# Patient Record
Sex: MALE | Race: WHITE | NOT HISPANIC OR LATINO | Employment: OTHER | URBAN - METROPOLITAN AREA
[De-identification: names, ages, dates, MRNs, and addresses within clinical notes are randomized per-mention and may not be internally consistent; named-entity substitution may affect disease eponyms.]

---

## 2017-02-21 ENCOUNTER — TELEPHONE (OUTPATIENT)
Dept: INTERNAL MEDICINE | Facility: CLINIC | Age: 82
End: 2017-02-21

## 2017-02-21 NOTE — TELEPHONE ENCOUNTER
Fax is received requesting orders for left and right knee brace and heating pad.    i called [facility] and told them we do not order this type of equipment and have no documentation of medical necessity for patient.  i have also faxed back form at their request with denial across it.    i left [facility] at home to call me to discuss fax.

## 2017-02-21 NOTE — TELEPHONE ENCOUNTER
----- Message from Bud Cummings sent at 2/21/2017  9:40 AM CST -----  Contact: [facility]  [phone]  Like to know have office receive fax for back & knee request    Please advise

## 2017-02-22 NOTE — TELEPHONE ENCOUNTER
i told daughter that equipment is not needed.   She agreed.     Knee into calf slight swelling.  Off and on.  Not constant. Orthopedic dr says it is arthritis and shots didn't help.  i told her if continues to bother him to make an appt for us to eval.

## 2017-02-22 NOTE — TELEPHONE ENCOUNTER
----- Message from Sonja Lehman sent at 2/22/2017  7:11 AM CST -----  Contact: Araceli - daughter at [phone]  Patient is returning a phone call.  Who left a message for the patient: Kiki  Does patient know what this is regarding:  pt  Comments:

## 2017-06-06 ENCOUNTER — TELEPHONE (OUTPATIENT)
Dept: INTERNAL MEDICINE | Facility: CLINIC | Age: 82
End: 2017-06-06

## 2017-06-06 NOTE — TELEPHONE ENCOUNTER
We have not received a fax on this patient.  i left [facility] to resend fax and gave our fax number

## 2017-06-06 NOTE — TELEPHONE ENCOUNTER
----- Message from Mercedez Rome sent at 6/6/2017  3:43 PM CDT -----  Contact: Cherie Pickard Gozent [phone]  Cherie was calling to check the status of a request she faxed over today,Please call

## 2017-06-12 NOTE — TELEPHONE ENCOUNTER
Fax received asking for rx for topical pain cram rx (naproxen with lidocaine and naproxen or diclofenac)    i sent back fax denying rx.  Dr appiah has never prescribed this for patient.

## 2017-08-07 ENCOUNTER — OFFICE VISIT (OUTPATIENT)
Dept: INTERNAL MEDICINE | Facility: CLINIC | Age: 82
End: 2017-08-07
Payer: COMMERCIAL

## 2017-08-07 ENCOUNTER — LAB VISIT (OUTPATIENT)
Dept: LAB | Facility: HOSPITAL | Age: 82
End: 2017-08-07
Attending: INTERNAL MEDICINE
Payer: COMMERCIAL

## 2017-08-07 VITALS
BODY MASS INDEX: 27.71 KG/M2 | TEMPERATURE: 98 F | DIASTOLIC BLOOD PRESSURE: 81 MMHG | SYSTOLIC BLOOD PRESSURE: 145 MMHG | RESPIRATION RATE: 16 BRPM | HEART RATE: 90 BPM | WEIGHT: 176.56 LBS | HEIGHT: 67 IN

## 2017-08-07 DIAGNOSIS — R60.0 LEG EDEMA, LEFT: ICD-10-CM

## 2017-08-07 DIAGNOSIS — E78.9 LIPID DISORDER: Primary | ICD-10-CM

## 2017-08-07 LAB
ANION GAP SERPL CALC-SCNC: 11 MMOL/L
BASOPHILS # BLD AUTO: 0.05 K/UL
BASOPHILS NFR BLD: 0.6 %
BUN SERPL-MCNC: 25 MG/DL
CALCIUM SERPL-MCNC: 10.2 MG/DL
CHLORIDE SERPL-SCNC: 109 MMOL/L
CO2 SERPL-SCNC: 24 MMOL/L
CREAT SERPL-MCNC: 1.2 MG/DL
DIFFERENTIAL METHOD: ABNORMAL
EOSINOPHIL # BLD AUTO: 0.4 K/UL
EOSINOPHIL NFR BLD: 4 %
ERYTHROCYTE [DISTWIDTH] IN BLOOD BY AUTOMATED COUNT: 14.2 %
EST. GFR  (AFRICAN AMERICAN): >60 ML/MIN/1.73 M^2
EST. GFR  (NON AFRICAN AMERICAN): 55.2 ML/MIN/1.73 M^2
GLUCOSE SERPL-MCNC: 110 MG/DL
HCT VFR BLD AUTO: 42.4 %
HGB BLD-MCNC: 14.3 G/DL
LYMPHOCYTES # BLD AUTO: 1.5 K/UL
LYMPHOCYTES NFR BLD: 17 %
MCH RBC QN AUTO: 30.4 PG
MCHC RBC AUTO-ENTMCNC: 33.7 G/DL
MCV RBC AUTO: 90 FL
MONOCYTES # BLD AUTO: 0.8 K/UL
MONOCYTES NFR BLD: 8.7 %
NEUTROPHILS # BLD AUTO: 6.3 K/UL
NEUTROPHILS NFR BLD: 69.5 %
PLATELET # BLD AUTO: 215 K/UL
PMV BLD AUTO: 11.5 FL
POTASSIUM SERPL-SCNC: 4.6 MMOL/L
RBC # BLD AUTO: 4.71 M/UL
SODIUM SERPL-SCNC: 144 MMOL/L
WBC # BLD AUTO: 9.05 K/UL

## 2017-08-07 PROCEDURE — 85379 FIBRIN DEGRADATION QUANT: CPT

## 2017-08-07 PROCEDURE — 1126F AMNT PAIN NOTED NONE PRSNT: CPT | Mod: S$GLB,,, | Performed by: INTERNAL MEDICINE

## 2017-08-07 PROCEDURE — 99214 OFFICE O/P EST MOD 30 MIN: CPT | Mod: S$GLB,,, | Performed by: INTERNAL MEDICINE

## 2017-08-07 PROCEDURE — 80048 BASIC METABOLIC PNL TOTAL CA: CPT

## 2017-08-07 PROCEDURE — 36415 COLL VENOUS BLD VENIPUNCTURE: CPT | Mod: PO

## 2017-08-07 PROCEDURE — 85025 COMPLETE CBC W/AUTO DIFF WBC: CPT

## 2017-08-07 PROCEDURE — 3008F BODY MASS INDEX DOCD: CPT | Mod: S$GLB,,, | Performed by: INTERNAL MEDICINE

## 2017-08-07 PROCEDURE — 3079F DIAST BP 80-89 MM HG: CPT | Mod: S$GLB,,, | Performed by: INTERNAL MEDICINE

## 2017-08-07 PROCEDURE — 3077F SYST BP >= 140 MM HG: CPT | Mod: S$GLB,,, | Performed by: INTERNAL MEDICINE

## 2017-08-07 PROCEDURE — 99999 PR PBB SHADOW E&M-EST. PATIENT-LVL III: CPT | Mod: PBBFAC,,, | Performed by: INTERNAL MEDICINE

## 2017-08-07 PROCEDURE — 1159F MED LIST DOCD IN RCRD: CPT | Mod: S$GLB,,, | Performed by: INTERNAL MEDICINE

## 2017-08-07 RX ORDER — PRAVASTATIN SODIUM 40 MG/1
40 TABLET ORAL NIGHTLY
Qty: 90 TABLET | Refills: 3 | Status: SHIPPED | OUTPATIENT
Start: 2017-08-07 | End: 2018-04-23 | Stop reason: SDUPTHER

## 2017-08-08 ENCOUNTER — TELEPHONE (OUTPATIENT)
Dept: INTERNAL MEDICINE | Facility: CLINIC | Age: 82
End: 2017-08-08

## 2017-08-08 DIAGNOSIS — R60.0 LEG EDEMA, LEFT: Primary | ICD-10-CM

## 2017-08-08 LAB — D DIMER PPP IA.FEU-MCNC: 1.58 MG/L FEU

## 2017-08-09 NOTE — TELEPHONE ENCOUNTER
Daughter  Francheska called me back.  She and her sister came for appt.    She can bring him for ct to [place] Thursday anytime or Friday 10-noon.  I sent kyree wang msg to [facility] book asap.  I was unable to find opening.

## 2017-08-10 ENCOUNTER — HOSPITAL ENCOUNTER (OUTPATIENT)
Dept: RADIOLOGY | Facility: HOSPITAL | Age: 82
Discharge: HOME | End: 2017-08-10
Attending: INTERNAL MEDICINE
Payer: COMMERCIAL

## 2017-08-10 DIAGNOSIS — R60.0 LEG EDEMA, LEFT: ICD-10-CM

## 2017-08-10 PROCEDURE — 71275 CT ANGIOGRAPHY CHEST: CPT | Mod: 26,,, | Performed by: RADIOLOGY

## 2017-08-10 PROCEDURE — 71275 CT ANGIOGRAPHY CHEST: CPT | Mod: TC

## 2017-08-10 PROCEDURE — 25500020 PHARM REV CODE 255: Performed by: INTERNAL MEDICINE

## 2017-08-10 RX ADMIN — IOHEXOL 100 ML: 350 INJECTION, SOLUTION INTRAVENOUS at 09:08

## 2017-08-10 NOTE — TELEPHONE ENCOUNTER
I left msg on home recorder ct was all fine.  Call if not improving with leg swelling on support hose or if any questions..

## 2017-08-10 NOTE — TELEPHONE ENCOUNTER
----- Message from Santi Macias MD sent at 8/10/2017 10:28 AM CDT -----  No problems with pulmonary circulation.  He does have some mild dis of arteries

## 2017-08-10 NOTE — PROGRESS NOTES
Subjective:       Patient ID: Yogi Urbina is a 84 y.o. male.    Chief Complaint: Annual Exam (swollen leg)  HISTORY OF PRESENT ILLNESS:  An 84-year-old white male patient of mine coming in   with two of his children, one who is very loquacious.  She did most of the   talking and was talking while I was examining him as well.  He has had problems   with left leg, swelling for the last two weeks.  He has had real problems with   arthritis in his left knee, saw Dr. Gibbs at [facility] for   this recently.  He had sent him subsequent to that visit for an ultrasound of   his left leg, which was reportedly negative.  At the time of the visit, I did   not have that report and subsequently received it.  It was dated 04/22,   suggesting no deep venous thrombosis, but does not talk about whether he has any   venous insufficiency.  The patient is having no shortness of breath or pain.    No fever.    PHYSICAL EXAMINATION:  VITAL SIGNS:  Normal.  SKIN:  Fair and healthy.  The left knee is very thickened.  HEART:  There is a little bit warm.  ABDOMEN:  He has no effusion.  He has a Baker cyst.  EXTREMITIES:  He has 2 to 3+ edema of his left leg below that.  Negative cords.    Negative Homans.  No tenderness.  No heat.    Likely this is venous disease as a result of his arthritic knee interfering with   his venous drainage going past that.  I did lab, which included BMP, which was   normal.  D-dimer is slightly elevated at 1.58 and CBC which was normal.  I told   them to get a support hose for him from the drugstore to put on every morning   and if he could do physical activity involving his calf without putting a strain   on his knee that would be useful, aspirin once a day.  With the elevated   D-dimer, I ordered a CTA of the lung, which showed no pulmonary emboli, but he   does have kidney stones.    IMPRESSION:  1.  Venous sluggishness secondary to his severe arthritis, left knee.  2.  Elevated D-dimer,  probably on the basis of that.  3.  Severe arthritis, left knee.  4.  Kidney stones seen on the CTA, but asymptomatic.      JDC/HN  dd: 08/13/2017 13:13:40 (CDT)  td: 08/14/2017 05:45:33 (CDT)  Doc ID   #[id number]  Job ID #[id number]    CC:     [facility] [id number]  HPI  Review of Systems   Constitutional: Negative for activity change, appetite change, fatigue and unexpected weight change.   HENT: Negative for dental problem, hearing loss, mouth sores, postnasal drip and sinus pressure.    Eyes: Negative for discharge and visual disturbance.   Respiratory: Negative for cough and shortness of breath.    Cardiovascular: Positive for leg swelling. Negative for chest pain and palpitations.   Gastrointestinal: Negative for abdominal pain, blood in stool, constipation, diarrhea and nausea.   Genitourinary: Negative for dysuria, hematuria and testicular pain.   Musculoskeletal: Positive for arthralgias and joint swelling. Negative for back pain and neck pain.   Skin: Negative for rash.   Neurological: Negative for weakness and headaches.   Psychiatric/Behavioral: Negative for agitation and sleep disturbance.       Objective:      Physical Exam   Constitutional: He is oriented to person, place, and time. He appears well-developed and well-nourished.   HENT:   Head: Normocephalic.   Right Ear: External ear normal.   Left Ear: External ear normal.   Mouth/Throat: Oropharynx is clear and moist.   Eyes: EOM are normal. Pupils are equal, round, and reactive to light. Right eye exhibits no discharge.   Neck: Normal range of motion. No JVD present. No tracheal deviation present. No thyromegaly present.   Cardiovascular: Normal rate, regular rhythm, normal heart sounds and intact distal pulses.  Exam reveals no gallop.    No murmur heard.  Pulmonary/Chest: Effort normal and breath sounds normal. He has no wheezes. He has no rales.   Abdominal: Soft. Bowel sounds are normal. He exhibits no mass. There is no tenderness.   Genitourinary: Prostate  normal and penis normal. Rectal exam shows guaiac negative stool.   Musculoskeletal: Normal range of motion. He exhibits edema and tenderness.   Lymphadenopathy:     He has no cervical adenopathy.   Neurological: He is oriented to person, place, and time. He displays normal reflexes. No cranial nerve deficit. Coordination normal.   Skin: Skin is warm. No rash noted. No pallor.   Psychiatric: He has a normal mood and affect.       Assessment:       1. Lipid disorder    2. Leg edema, left        Plan:

## 2018-04-23 ENCOUNTER — OFFICE VISIT (OUTPATIENT)
Dept: INTERNAL MEDICINE | Facility: CLINIC | Age: 83
End: 2018-04-23
Payer: COMMERCIAL

## 2018-04-23 VITALS
WEIGHT: 183 LBS | SYSTOLIC BLOOD PRESSURE: 128 MMHG | DIASTOLIC BLOOD PRESSURE: 78 MMHG | HEART RATE: 76 BPM | HEIGHT: 67 IN | RESPIRATION RATE: 16 BRPM | BODY MASS INDEX: 28.72 KG/M2 | TEMPERATURE: 98 F

## 2018-04-23 DIAGNOSIS — E78.9 LIPID DISORDER: ICD-10-CM

## 2018-04-23 DIAGNOSIS — M75.112 INCOMPLETE TEAR OF LEFT ROTATOR CUFF: ICD-10-CM

## 2018-04-23 DIAGNOSIS — R60.0 LOWER LEG EDEMA: Primary | ICD-10-CM

## 2018-04-23 DIAGNOSIS — M17.0 ARTHRITIS OF BOTH KNEES: ICD-10-CM

## 2018-04-23 PROCEDURE — 99214 OFFICE O/P EST MOD 30 MIN: CPT | Mod: S$GLB,,, | Performed by: INTERNAL MEDICINE

## 2018-04-23 PROCEDURE — 3078F DIAST BP <80 MM HG: CPT | Mod: CPTII,S$GLB,, | Performed by: INTERNAL MEDICINE

## 2018-04-23 PROCEDURE — 99999 PR PBB SHADOW E&M-EST. PATIENT-LVL III: CPT | Mod: PBBFAC,,, | Performed by: INTERNAL MEDICINE

## 2018-04-23 PROCEDURE — 3074F SYST BP LT 130 MM HG: CPT | Mod: CPTII,S$GLB,, | Performed by: INTERNAL MEDICINE

## 2018-04-23 RX ORDER — PRAVASTATIN SODIUM 40 MG/1
40 TABLET ORAL NIGHTLY
Qty: 90 TABLET | Refills: 3 | Status: SHIPPED | OUTPATIENT
Start: 2018-04-23 | End: 2018-11-13 | Stop reason: SDUPTHER
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
## 2018-11-03 ENCOUNTER — LAB VISIT (OUTPATIENT)
Dept: LAB | Facility: HOSPITAL | Age: 83
End: 2018-11-03
Attending: INTERNAL MEDICINE
Payer: COMMERCIAL

## 2018-11-03 DIAGNOSIS — Z00.00 ANNUAL PHYSICAL EXAM: ICD-10-CM

## 2018-11-03 DIAGNOSIS — Z00.00 ANNUAL PHYSICAL EXAM: Primary | ICD-10-CM

## 2018-11-03 LAB
ANION GAP SERPL CALC-SCNC: 8 MMOL/L
BASOPHILS # BLD AUTO: 0.08 K/UL
BASOPHILS NFR BLD: 1 %
BUN SERPL-MCNC: 17 MG/DL
CALCIUM SERPL-MCNC: 10.1 MG/DL
CHLORIDE SERPL-SCNC: 107 MMOL/L
CO2 SERPL-SCNC: 27 MMOL/L
CREAT SERPL-MCNC: 1.1 MG/DL
DIFFERENTIAL METHOD: ABNORMAL
EOSINOPHIL # BLD AUTO: 0.6 K/UL
EOSINOPHIL NFR BLD: 7.2 %
ERYTHROCYTE [DISTWIDTH] IN BLOOD BY AUTOMATED COUNT: 13.9 %
EST. GFR  (AFRICAN AMERICAN): >60 ML/MIN/1.73 M^2
EST. GFR  (NON AFRICAN AMERICAN): >60 ML/MIN/1.73 M^2
GLUCOSE SERPL-MCNC: 98 MG/DL
HCT VFR BLD AUTO: 46.5 %
HGB BLD-MCNC: 14.7 G/DL
IMM GRANULOCYTES # BLD AUTO: 0.02 K/UL
IMM GRANULOCYTES NFR BLD AUTO: 0.2 %
LYMPHOCYTES # BLD AUTO: 1.5 K/UL
LYMPHOCYTES NFR BLD: 18 %
MCH RBC QN AUTO: 29.1 PG
MCHC RBC AUTO-ENTMCNC: 31.6 G/DL
MCV RBC AUTO: 92 FL
MONOCYTES # BLD AUTO: 0.8 K/UL
MONOCYTES NFR BLD: 10.2 %
NEUTROPHILS # BLD AUTO: 5.2 K/UL
NEUTROPHILS NFR BLD: 63.4 %
NRBC BLD-RTO: 0 /100 WBC
PLATELET # BLD AUTO: 222 K/UL
PMV BLD AUTO: 11.6 FL
POTASSIUM SERPL-SCNC: 4.9 MMOL/L
RBC # BLD AUTO: 5.06 M/UL
SODIUM SERPL-SCNC: 142 MMOL/L
WBC # BLD AUTO: 8.16 K/UL

## 2018-11-03 PROCEDURE — 80048 BASIC METABOLIC PNL TOTAL CA: CPT

## 2018-11-03 PROCEDURE — 85025 COMPLETE CBC W/AUTO DIFF WBC: CPT

## 2018-11-03 PROCEDURE — 85379 FIBRIN DEGRADATION QUANT: CPT

## 2018-11-03 PROCEDURE — 36415 COLL VENOUS BLD VENIPUNCTURE: CPT | Mod: PO

## 2018-11-05 ENCOUNTER — TELEPHONE (OUTPATIENT)
Dept: INTERNAL MEDICINE | Facility: CLINIC | Age: 83
End: 2018-11-05

## 2018-11-05 DIAGNOSIS — Z00.00 ANNUAL PHYSICAL EXAM: Primary | ICD-10-CM

## 2018-11-05 LAB — D DIMER PPP IA.FEU-MCNC: 1.13 MG/L FEU

## 2018-11-05 NOTE — TELEPHONE ENCOUNTER
Appreciate dr tello help with orders but they were not annual labs.  I left msg for daughter, apologized but more labs need to be done.  I never got a original msg for the orders.    I put in the additional labs he needs.  I said she can either come in earlier to see dr appiah and stop at [facility] to do fasting labs or we can arrange another day  If she wants  To.    Lab orders are in dated 11/5/18 . I booked same day seeing dr appiah and daughter can move if wants to.

## 2018-11-05 NOTE — TELEPHONE ENCOUNTER
----- Message from Pavithra Herman sent at 11/3/2018  9:52 AM CDT -----  Contact: Patient and daughter have labs scheduled today!! 11/3/18  Kiki,    Patient and daughter came in today (11/3/18), for labs that was scheduled, but the orders was not in and the daughter was upset because her dad has dementia, and the dad was very anxious, daughter insist on getting the orders in, so they sent them to the [facility], Kiera (rn/ma) was tied up in the room with a patient for a while, so  look at they chart and put the same orders in that Dr. Macias order before for them.  Dr. Fu specifically ask me to let you and Dr. Macias know that she did put them in, please give her a call on Monday, its would be under her name, so I guess you guys can re-enter under Dr. Macias name?      Thanks Steffi,

## 2018-11-05 NOTE — TELEPHONE ENCOUNTER
----- Message from Pavithra Herman sent at 11/3/2018 10:47 AM CDT -----  Contact: Patient was here for labs on Saturday 11/3/18  Kiki,  I'm sorry, I don't think I gave your the patients name and cl#.  Yogi Urbina Cl# [id number], and daughter; Liliana Cl# [id number].    Thanks Steffi,

## 2018-11-13 ENCOUNTER — OFFICE VISIT (OUTPATIENT)
Dept: INTERNAL MEDICINE | Facility: CLINIC | Age: 83
End: 2018-11-13
Payer: COMMERCIAL

## 2018-11-13 ENCOUNTER — LAB VISIT (OUTPATIENT)
Dept: LAB | Facility: HOSPITAL | Age: 83
End: 2018-11-13
Attending: INTERNAL MEDICINE
Payer: COMMERCIAL

## 2018-11-13 VITALS
HEART RATE: 84 BPM | WEIGHT: 175.69 LBS | DIASTOLIC BLOOD PRESSURE: 80 MMHG | RESPIRATION RATE: 18 BRPM | TEMPERATURE: 98 F | BODY MASS INDEX: 27.57 KG/M2 | HEIGHT: 67 IN | SYSTOLIC BLOOD PRESSURE: 144 MMHG

## 2018-11-13 DIAGNOSIS — M54.16 LUMBAR RADICULOPATHY: Primary | ICD-10-CM

## 2018-11-13 DIAGNOSIS — E78.9 LIPID DISORDER: ICD-10-CM

## 2018-11-13 DIAGNOSIS — Z00.00 ANNUAL PHYSICAL EXAM: ICD-10-CM

## 2018-11-13 DIAGNOSIS — M17.0 ARTHRITIS OF BOTH KNEES: ICD-10-CM

## 2018-11-13 DIAGNOSIS — R60.0 LOWER LEG EDEMA: ICD-10-CM

## 2018-11-13 LAB
ALBUMIN SERPL BCP-MCNC: 3.7 G/DL
ALP SERPL-CCNC: 57 U/L
ALT SERPL W/O P-5'-P-CCNC: 10 U/L
ANION GAP SERPL CALC-SCNC: 9 MMOL/L
AST SERPL-CCNC: 11 U/L
BILIRUB SERPL-MCNC: 1 MG/DL
BUN SERPL-MCNC: 18 MG/DL
CALCIUM SERPL-MCNC: 9.2 MG/DL
CHLORIDE SERPL-SCNC: 104 MMOL/L
CHOLEST SERPL-MCNC: 189 MG/DL
CHOLEST/HDLC SERPL: 4.8 {RATIO}
CO2 SERPL-SCNC: 28 MMOL/L
COMPLEXED PSA SERPL-MCNC: 3.2 NG/ML
CREAT SERPL-MCNC: 1.2 MG/DL
EST. GFR  (AFRICAN AMERICAN): >60 ML/MIN/1.73 M^2
EST. GFR  (NON AFRICAN AMERICAN): 54.4 ML/MIN/1.73 M^2
GLUCOSE SERPL-MCNC: 92 MG/DL
HDLC SERPL-MCNC: 39 MG/DL
HDLC SERPL: 20.6 %
LDLC SERPL CALC-MCNC: 111.6 MG/DL
NONHDLC SERPL-MCNC: 150 MG/DL
POTASSIUM SERPL-SCNC: 4.1 MMOL/L
PROT SERPL-MCNC: 6.7 G/DL
SODIUM SERPL-SCNC: 141 MMOL/L
TRIGL SERPL-MCNC: 192 MG/DL
TSH SERPL DL<=0.005 MIU/L-ACNC: 3.64 UIU/ML

## 2018-11-13 PROCEDURE — 36415 COLL VENOUS BLD VENIPUNCTURE: CPT | Mod: PO

## 2018-11-13 PROCEDURE — 80061 LIPID PANEL: CPT

## 2018-11-13 PROCEDURE — 84153 ASSAY OF PSA TOTAL: CPT

## 2018-11-13 PROCEDURE — 80053 COMPREHEN METABOLIC PANEL: CPT

## 2018-11-13 PROCEDURE — 99999 PR PBB SHADOW E&M-EST. PATIENT-LVL III: CPT | Mod: PBBFAC,,, | Performed by: INTERNAL MEDICINE

## 2018-11-13 PROCEDURE — 99397 PER PM REEVAL EST PAT 65+ YR: CPT | Mod: S$GLB,,, | Performed by: INTERNAL MEDICINE

## 2018-11-13 PROCEDURE — 84443 ASSAY THYROID STIM HORMONE: CPT

## 2018-11-13 RX ORDER — PRAVASTATIN SODIUM 40 MG/1
40 TABLET ORAL NIGHTLY
Qty: 90 TABLET | Refills: 3 | Status: SHIPPED | OUTPATIENT
Start: 2018-11-13 | End: 2020-01-06

## 2018-11-13 NOTE — PROGRESS NOTES
Subjective:       Patient ID: Yogi Urbina is a 86 y.o. male.    Chief Complaint: Annual Exam and Medication Refill  HISTORY OF PRESENT ILLNESS:  An 86-year-old white male, comes in for annual   review and is generally feeling well.  The patient's daughter was with him   against his desire because he wanted to make sure he is giving the right   history.  The main thing he is concerned about is his memory, which tends to be   short-term and not very well focused.  The patient has also had some problems   with pain in his knees and back and he has had a history of edema on and off in   his legs.  He has really no other complaints nor does she.  He had some blood   work done about 10 days ago that included a CBC, chemistries that were normal.    Additional testing was done on the day of the visit, which included PSA and   lipids, which showed an elevated triglyceride of 192, a TSH which is normal and   a comprehensive chemistry, which is all normal.    PHYSICAL EXAMINATION:  VITAL SIGNS:  Normal.  SKIN:  Fair and healthy.  HEENT:  Clear.  NECK:  Shows no adenopathy, thyroid enlargement or bruit.  CHEST:  Clear.  HEART:  There is no murmur or gallop.  ABDOMEN:  Nontender without any organomegaly.  NEUROLOGIC:  He has some problems with his memory that are obvious.  Speech is   normal.  He is conversant, interactive in an appropriate fashion.  He has no   focal neurologic problems.    IMPRESSION:  1.  Dementia, probably vascular age related.  2.  History of kidney stones.  3.  Hyperlipidemia.  4.  L-spine disease, controlled.  5.  Bilateral knee pain due to degenerative joint disease.  6.  History of kidney stones and sees his urologist, Dr. Verdugo, at [facility].  I will see him again if all is well in one year and prescriptions   were reordered.      GERARD/VERONICA  dd: 11/25/2018 20:51:46 (CST)  td: 11/26/2018 15:29:34 (CST)  Doc ID   #[id number]  Job ID #[id number]    CC:     Dict [id number]  HPI  Review of Systems    Constitutional: Negative for activity change, appetite change, fatigue and unexpected weight change.   HENT: Negative for dental problem, hearing loss, mouth sores, postnasal drip and sinus pressure.    Eyes: Negative for discharge and visual disturbance.   Respiratory: Negative for cough and shortness of breath.    Cardiovascular: Positive for leg swelling. Negative for chest pain and palpitations.   Gastrointestinal: Negative for abdominal pain, blood in stool, constipation, diarrhea and nausea.   Genitourinary: Negative for dysuria, hematuria and testicular pain.   Musculoskeletal: Positive for arthralgias and joint swelling. Negative for back pain and neck pain.   Skin: Negative for rash.   Neurological: Negative for weakness and headaches.   Psychiatric/Behavioral: Positive for confusion and decreased concentration. Negative for agitation and sleep disturbance.       Objective:      Physical Exam   Constitutional: He is oriented to person, place, and time. He appears well-developed and well-nourished.   HENT:   Head: Normocephalic.   Right Ear: External ear normal.   Left Ear: External ear normal.   Mouth/Throat: Oropharynx is clear and moist.   Eyes: EOM are normal. Pupils are equal, round, and reactive to light. Right eye exhibits no discharge.   Neck: Normal range of motion. No JVD present. No tracheal deviation present. No thyromegaly present.   Cardiovascular: Normal rate, regular rhythm, normal heart sounds and intact distal pulses. Exam reveals no gallop.   No murmur heard.  Pulmonary/Chest: Effort normal and breath sounds normal. He has no wheezes. He has no rales.   Abdominal: Soft. Bowel sounds are normal. He exhibits no mass. There is no tenderness.   Genitourinary: Prostate normal and penis normal. Rectal exam shows guaiac negative stool.   Musculoskeletal: Normal range of motion. He exhibits no edema.   Lymphadenopathy:     He has no cervical adenopathy.   Neurological: He is oriented to person,  place, and time. He displays normal reflexes. No cranial nerve deficit. Coordination normal.   Skin: Skin is warm. No rash noted. No pallor.   Psychiatric: He has a normal mood and affect.       Assessment:       1. Lumbar radiculopathy    2. Lipid disorder    3. Lower leg edema    4. Arthritis of both knees        Plan:

## 2018-11-29 ENCOUNTER — TELEPHONE (OUTPATIENT)
Dept: INTERNAL MEDICINE | Facility: CLINIC | Age: 83
End: 2018-11-29

## 2018-11-29 DIAGNOSIS — M79.604 PAIN IN BOTH LOWER EXTREMITIES: Primary | ICD-10-CM

## 2018-11-29 DIAGNOSIS — M79.605 PAIN IN BOTH LOWER EXTREMITIES: Primary | ICD-10-CM

## 2018-11-29 NOTE — TELEPHONE ENCOUNTER
----- Message from Apurva Bajwa sent at 11/28/2018  2:47 PM CST -----  Contact: [phone]  Calling to request a copy of last lab results be mailed to the home address.

## 2018-11-29 NOTE — TELEPHONE ENCOUNTER
Mailing copy of labs today.  I left msg on her cell explaining 's comments and I can help book the ultrasound if she calls me today.  I said testing is not urgent, just need to be done as a precaution

## 2018-11-29 NOTE — TELEPHONE ENCOUNTER
You saw him for annual and dr blake had ordered labs for you like he had previously so annual labs weren't done till 11/13 appt.    Please advise,  They want a copy mailed.  All ok?  You saw d dimer elevation?    Thanks louis

## 2018-12-13 ENCOUNTER — TELEPHONE (OUTPATIENT)
Dept: INTERNAL MEDICINE | Facility: CLINIC | Age: 83
End: 2018-12-13

## 2018-12-13 NOTE — TELEPHONE ENCOUNTER
He saw orthopedic recently and had xray and all was fine and he is feeling fine so they would like to put ultrasound on hold.    I told them to call to schedule if change their mind.  She wanted copy of labs mailed to her home- I sent to mr. leon address originally and he doesn't remember getting.  Her address [address] [place], [place] [address]

## 2019-11-14 ENCOUNTER — TELEPHONE (OUTPATIENT)
Dept: INTERNAL MEDICINE | Facility: CLINIC | Age: 84
End: 2019-11-14

## 2019-11-14 DIAGNOSIS — Z00.00 ANNUAL PHYSICAL EXAM: Primary | ICD-10-CM

## 2019-11-14 NOTE — TELEPHONE ENCOUNTER
----- Message from Elisha Canalesjohn sent at 11/14/2019  8:45 AM CST -----  Doctor appointment and lab have been scheduled.  Please link lab orders to the lab appointment.  Date of doctor appointment:    Date of lab appointment:    Physical or EP: 1/6/20  Comments: 1/3    REMINDER to appt coordinator: ## delete this from the message after reading! ##     1) Physical Established Patient: NO LABS FIRST for Celestine, Bill, Gus, Soto, Saroj and Murphy  2) The lab appointment must be at least 3 days from now to allow time for the order to be entered and linked to the appointment.   3) Lab appointment should be scheduled at least 3 days before the doctor appointment.

## 2020-01-03 ENCOUNTER — LAB VISIT (OUTPATIENT)
Dept: LAB | Facility: HOSPITAL | Age: 85
End: 2020-01-03
Attending: INTERNAL MEDICINE
Payer: COMMERCIAL

## 2020-01-03 DIAGNOSIS — Z00.00 ANNUAL PHYSICAL EXAM: ICD-10-CM

## 2020-01-03 LAB
ALBUMIN SERPL BCP-MCNC: 3.8 G/DL (ref 3.5–5.2)
ALP SERPL-CCNC: 61 U/L (ref 55–135)
ALT SERPL W/O P-5'-P-CCNC: 13 U/L (ref 10–44)
ANION GAP SERPL CALC-SCNC: 7 MMOL/L (ref 8–16)
AST SERPL-CCNC: 14 U/L (ref 10–40)
BASOPHILS # BLD AUTO: 0.05 K/UL (ref 0–0.2)
BASOPHILS NFR BLD: 0.6 % (ref 0–1.9)
BILIRUB SERPL-MCNC: 0.8 MG/DL (ref 0.1–1)
BUN SERPL-MCNC: 16 MG/DL (ref 8–23)
CALCIUM SERPL-MCNC: 9.7 MG/DL (ref 8.7–10.5)
CHLORIDE SERPL-SCNC: 106 MMOL/L (ref 95–110)
CHOLEST SERPL-MCNC: 185 MG/DL (ref 120–199)
CHOLEST/HDLC SERPL: 3.8 {RATIO} (ref 2–5)
CO2 SERPL-SCNC: 28 MMOL/L (ref 23–29)
COMPLEXED PSA SERPL-MCNC: 5.9 NG/ML (ref 0–4)
CREAT SERPL-MCNC: 1.5 MG/DL (ref 0.5–1.4)
DIFFERENTIAL METHOD: ABNORMAL
EOSINOPHIL # BLD AUTO: 0.4 K/UL (ref 0–0.5)
EOSINOPHIL NFR BLD: 5 % (ref 0–8)
ERYTHROCYTE [DISTWIDTH] IN BLOOD BY AUTOMATED COUNT: 13.8 % (ref 11.5–14.5)
EST. GFR  (AFRICAN AMERICAN): 47.7 ML/MIN/1.73 M^2
EST. GFR  (NON AFRICAN AMERICAN): 41.3 ML/MIN/1.73 M^2
GLUCOSE SERPL-MCNC: 96 MG/DL (ref 70–110)
HCT VFR BLD AUTO: 45.5 % (ref 40–54)
HDLC SERPL-MCNC: 49 MG/DL (ref 40–75)
HDLC SERPL: 26.5 % (ref 20–50)
HGB BLD-MCNC: 14.1 G/DL (ref 14–18)
IMM GRANULOCYTES # BLD AUTO: 0.03 K/UL (ref 0–0.04)
IMM GRANULOCYTES NFR BLD AUTO: 0.4 % (ref 0–0.5)
LDLC SERPL CALC-MCNC: 112.6 MG/DL (ref 63–159)
LYMPHOCYTES # BLD AUTO: 1.6 K/UL (ref 1–4.8)
LYMPHOCYTES NFR BLD: 19.9 % (ref 18–48)
MCH RBC QN AUTO: 29.7 PG (ref 27–31)
MCHC RBC AUTO-ENTMCNC: 31 G/DL (ref 32–36)
MCV RBC AUTO: 96 FL (ref 82–98)
MONOCYTES # BLD AUTO: 0.8 K/UL (ref 0.3–1)
MONOCYTES NFR BLD: 9.5 % (ref 4–15)
NEUTROPHILS # BLD AUTO: 5.3 K/UL (ref 1.8–7.7)
NEUTROPHILS NFR BLD: 64.6 % (ref 38–73)
NONHDLC SERPL-MCNC: 136 MG/DL
NRBC BLD-RTO: 0 /100 WBC
PLATELET # BLD AUTO: 177 K/UL (ref 150–350)
PMV BLD AUTO: 11.4 FL (ref 9.2–12.9)
POTASSIUM SERPL-SCNC: 5.1 MMOL/L (ref 3.5–5.1)
PROT SERPL-MCNC: 6.6 G/DL (ref 6–8.4)
RBC # BLD AUTO: 4.75 M/UL (ref 4.6–6.2)
SODIUM SERPL-SCNC: 141 MMOL/L (ref 136–145)
TRIGL SERPL-MCNC: 117 MG/DL (ref 30–150)
TSH SERPL DL<=0.005 MIU/L-ACNC: 3.24 UIU/ML (ref 0.4–4)
WBC # BLD AUTO: 8.19 K/UL (ref 3.9–12.7)

## 2020-01-03 PROCEDURE — 80061 LIPID PANEL: CPT

## 2020-01-03 PROCEDURE — 85025 COMPLETE CBC W/AUTO DIFF WBC: CPT

## 2020-01-03 PROCEDURE — 84153 ASSAY OF PSA TOTAL: CPT

## 2020-01-03 PROCEDURE — 36415 COLL VENOUS BLD VENIPUNCTURE: CPT | Mod: PO

## 2020-01-03 PROCEDURE — 84443 ASSAY THYROID STIM HORMONE: CPT

## 2020-01-03 PROCEDURE — 80053 COMPREHEN METABOLIC PANEL: CPT

## 2020-01-06 ENCOUNTER — OFFICE VISIT (OUTPATIENT)
Dept: INTERNAL MEDICINE | Facility: CLINIC | Age: 85
End: 2020-01-06
Payer: COMMERCIAL

## 2020-01-06 VITALS
SYSTOLIC BLOOD PRESSURE: 158 MMHG | RESPIRATION RATE: 14 BRPM | DIASTOLIC BLOOD PRESSURE: 84 MMHG | TEMPERATURE: 98 F | BODY MASS INDEX: 28 KG/M2 | WEIGHT: 178.38 LBS | HEIGHT: 67 IN | HEART RATE: 76 BPM

## 2020-01-06 DIAGNOSIS — E78.9 LIPID DISORDER: ICD-10-CM

## 2020-01-06 DIAGNOSIS — Z00.00 ANNUAL PHYSICAL EXAM: Primary | ICD-10-CM

## 2020-01-06 DIAGNOSIS — R97.20 PSA ELEVATION: ICD-10-CM

## 2020-01-06 DIAGNOSIS — M17.0 ARTHRITIS OF BOTH KNEES: ICD-10-CM

## 2020-01-06 DIAGNOSIS — M54.16 LUMBAR RADICULOPATHY: ICD-10-CM

## 2020-01-06 PROCEDURE — 99999 PR PBB SHADOW E&M-EST. PATIENT-LVL III: ICD-10-PCS | Mod: PBBFAC,,, | Performed by: INTERNAL MEDICINE

## 2020-01-06 PROCEDURE — 99999 PR PBB SHADOW E&M-EST. PATIENT-LVL III: CPT | Mod: PBBFAC,,, | Performed by: INTERNAL MEDICINE

## 2020-01-06 PROCEDURE — 99397 PER PM REEVAL EST PAT 65+ YR: CPT | Mod: S$GLB,,, | Performed by: INTERNAL MEDICINE

## 2020-01-06 PROCEDURE — 99397 PR PREVENTIVE VISIT,EST,65 & OVER: ICD-10-PCS | Mod: S$GLB,,, | Performed by: INTERNAL MEDICINE

## 2020-01-06 NOTE — PROGRESS NOTES
Subjective:       Patient ID: Yogi Urbina is a 87 y.o. male.    Chief Complaint: Annual Exam  Dictation #1  MRN:[id number]  CSN:[id number]  Dict 87-year-old white male brought in by his daughter who stayed with him through the exam.  Patient had blood work done prior to the visit which was reviewed with both of them.  He had a PSA of 5.9 which is new and red cells in his urine.  He has had hematuria on all previous visits, with a history of kidney stones.  He has seen Urology in the past.  He he has no come planes except for pain in his left knee for which he sees Orthopedics every few months.  He has slowly progressive dementia.  He occasionally bumps his head on a cabinet which she had done prior to this visit with the scab on the left side of his scalp.    Physical exam:  Vital signs-normal with the exception of slightly elevated blood pressure.  Repeat blood pressure was 139 over 78.    Skin-scab on his some scalp on the left side her chest, and pigmentation and other changes having to do with H  HEENT-bilateral cerumen impaction without symptoms  Neck-no adenopathy, thyroid enlargement, bruit  Chest-clear percussion auscultation  Heart-no murmur, gallop, irregularity  Abdomen-slightly obese, nontender, no organomegaly, no mass, no fullness, no bruit, bowel sounds active  Rectal did-deferred  Extremities-thickening of both knees with no inflammatory change, no edema, pulses intact  Neurologic-moderate dementia with good speech and responsiveness      Impression:  1.  Slowly progressive vascular dementia  2.  Elevated PSA-will repeat in 6 months  3.  Degenerative changes both knees the left is seems to be most symptomatic  4.  History of hyperlipidemia-he was on Pravachol which she discontinued several months ago in his cholesterol is now normal so I have told him to not given the medication anymore  5.  History of kidney stones  6.  Hematuria likely related to his kidney stones    Plan:  1.  I will see him again  in 6 months for repeat PSA and check 2.  We discussed him seeing Urology and they were reluctant to do that 3.  Patient will remain off protocol   HPI  Review of Systems   Constitutional: Negative for activity change, appetite change, fatigue and unexpected weight change.   HENT: Negative for dental problem, hearing loss, mouth sores, postnasal drip and sinus pressure.    Eyes: Negative for discharge and visual disturbance.   Respiratory: Negative for cough and shortness of breath.    Cardiovascular: Negative for chest pain and palpitations.   Gastrointestinal: Negative for abdominal pain, blood in stool, constipation, diarrhea and nausea.   Genitourinary: Negative for dysuria, hematuria and testicular pain.   Musculoskeletal: Positive for arthralgias. Negative for back pain, joint swelling and neck pain.   Skin: Negative for rash.   Neurological: Negative for weakness and headaches.   Psychiatric/Behavioral: Positive for decreased concentration. Negative for agitation and sleep disturbance.       Objective:      Physical Exam   Constitutional: He is oriented to person, place, and time. He appears well-developed and well-nourished.   HENT:   Head: Normocephalic.   Right Ear: External ear normal.   Left Ear: External ear normal.   Mouth/Throat: Oropharynx is clear and moist.   Eyes: Pupils are equal, round, and reactive to light. EOM are normal. Right eye exhibits no discharge.   Neck: Normal range of motion. No JVD present. No tracheal deviation present. No thyromegaly present.   Cardiovascular: Normal rate, regular rhythm, normal heart sounds and intact distal pulses. Exam reveals no gallop.   No murmur heard.  Pulmonary/Chest: Effort normal and breath sounds normal. He has no wheezes. He has no rales.   Abdominal: Soft. Bowel sounds are normal. He exhibits no mass. There is no tenderness.   Genitourinary: Prostate normal and penis normal. Rectal exam shows guaiac negative stool.   Musculoskeletal: Normal range of  motion. He exhibits no edema.   Lymphadenopathy:     He has no cervical adenopathy.   Neurological: He is oriented to person, place, and time. He displays normal reflexes. No cranial nerve deficit. Coordination normal.   Skin: Skin is warm. No rash noted. No pallor.   Psychiatric: He has a normal mood and affect.       Assessment:       1. Annual physical exam    2. Lipid disorder    3. Arthritis of both knees    4. Lumbar radiculopathy        Plan:

## 2020-08-31 ENCOUNTER — TELEPHONE (OUTPATIENT)
Dept: INTERNAL MEDICINE | Facility: CLINIC | Age: 85
End: 2020-08-31

## 2020-08-31 NOTE — TELEPHONE ENCOUNTER
Please set up virtual visit this week and will need records from [facility] before that with discharge not, lab and xrays

## 2020-09-01 ENCOUNTER — OFFICE VISIT (OUTPATIENT)
Dept: INTERNAL MEDICINE | Facility: CLINIC | Age: 85
End: 2020-09-01
Payer: MEDICARE

## 2020-09-01 DIAGNOSIS — R97.20 PSA ELEVATION: ICD-10-CM

## 2020-09-01 DIAGNOSIS — U07.1 COVID-19 VIRUS INFECTION: ICD-10-CM

## 2020-09-01 DIAGNOSIS — F01.50 VASCULAR DEMENTIA WITHOUT BEHAVIORAL DISTURBANCE: ICD-10-CM

## 2020-09-01 DIAGNOSIS — N39.0 SEPSIS DUE TO URINARY TRACT INFECTION: Primary | ICD-10-CM

## 2020-09-01 DIAGNOSIS — A41.9 SEPSIS DUE TO URINARY TRACT INFECTION: Primary | ICD-10-CM

## 2020-09-01 PROCEDURE — 1101F PT FALLS ASSESS-DOCD LE1/YR: CPT | Mod: CPTII,95,, | Performed by: INTERNAL MEDICINE

## 2020-09-01 PROCEDURE — 99443 PR PHYSICIAN TELEPHONE EVALUATION 21-30 MIN: ICD-10-PCS | Mod: 95,,, | Performed by: INTERNAL MEDICINE

## 2020-09-01 PROCEDURE — 1159F PR MEDICATION LIST DOCUMENTED IN MEDICAL RECORD: ICD-10-PCS | Mod: 95,,, | Performed by: INTERNAL MEDICINE

## 2020-09-01 PROCEDURE — 1101F PR PT FALLS ASSESS DOC 0-1 FALLS W/OUT INJ PAST YR: ICD-10-PCS | Mod: CPTII,95,, | Performed by: INTERNAL MEDICINE

## 2020-09-01 PROCEDURE — 99443 PR PHYSICIAN TELEPHONE EVALUATION 21-30 MIN: CPT | Mod: 95,,, | Performed by: INTERNAL MEDICINE

## 2020-09-01 PROCEDURE — 1159F MED LIST DOCD IN RCRD: CPT | Mod: 95,,, | Performed by: INTERNAL MEDICINE

## 2020-09-03 NOTE — PROGRESS NOTES
Established Patient - Audio Only Telehealth Visit     The patient location is: home  The chief complaint leading to consultation is: hospital f/u  Visit type: Virtual visit with audio only (telephone)  Total time spent with patient: 25 min       The reason for the audio only service rather than synchronous audio and video virtual visit was related to technical difficulties or patient preference/necessity.     Each patient to whom I provide medical services by telemedicine is:  (1) informed of the relationship between the physician and patient and the respective role of any other health care provider with respect to management of the patient; and (2) notified that they may decline to receive medical services by telemedicine and may withdraw from such care at any time. Patient verbally consented to receive this service via voice-only telephone call.       HPI:  87-year-old white male patient mine who was recently in [facility] for 8 days.  Patient has been out of the hospital for the last 3 days and is doing well at home.  He has  2 daughters who are very attentive and exercising very close oversight on his situation.  Patient was admitted to [facility] with a fever, dehydrated, severely weak.  He was diagnosed as having a urinary infection and sent home on antibiotics after an evaluation.  COVID test was done at that time but no results for several days.  After the patient went home he developed nausea and vomiting and worsening of his dementia and was taken back to the emergency room and admitted.  His COVID test subsequently was positive though the family is not certain about the reliability of the results.  He had a chest x-ray done suggesting a mild infection 1 long with suggestions for follow-up on that.  The patient did not require oxygen or pulmonary support.  Patient is at home with home health and physical therapy is going to begin.  Patient was treated with a number of intravenous medicines the  family it is unaware of what type but was sent home on the final taper of his dexamethasone and is off after today's dose.    I have no records of his hospitalization.  The patient is currently on no medication.    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  He is not having any behavior problems and is mostly just forgetful with short-term memory.  He has no fever at this point.    Physical exam:  Not done since this is a virtual visit    Impression:  1.  Dementia-likely vascular in nature and is nearly back to his pre-hospital baseline  2.  Presumed urinary tract infection with sepsis  3.  Questionable COVID infection  4.  History of kidney stones but no recent problems and is off his medication for that  5.  History of elevated PSA-no longer seeing his urologist    Plan:  1.  Going to attain is [facility] records including discharge summary, lab, x-rays  2.  I will see him again in 3 weeks  3.  Patient is off all medication currently and family will call if he is changing.  4.  Patient is already being seen by home health and physical therapy is going to start tomorrow.                        This service was not originating from a related E/M service provided within the previous 7 days nor will  to an E/M service or procedure within the next 24 hours or my soonest available appointment.  Prevailing standard of care was able to be met in this audio-only visit.

## 2020-09-04 ENCOUNTER — TELEPHONE (OUTPATIENT)
Dept: INTERNAL MEDICINE | Facility: CLINIC | Age: 85
End: 2020-09-04

## 2020-09-04 NOTE — TELEPHONE ENCOUNTER
I reviewed records from [facility] and looks as though he did not have UTI.  I think that he had COVID but should do f/u antibody test in a few weeks to ascertain that.  We should also get f/u lab on him in about 1 week with [facility]: bmp,esr,cpk,cbc.  Thanks

## 2020-09-08 NOTE — TELEPHONE ENCOUNTER
Left msg for [facility] to call us.  Which home health do they have?  I need to give them lab orders.

## 2020-09-09 NOTE — TELEPHONE ENCOUNTER
Daughter says have [facility] home [phone]. I told her labs needed and will call orders to home health.    I spoke to aure at [facility].  Told them labs needed next week and 9/30 covid antibody test needed.. I gave verbal.  She will be sure gets done.

## 2020-09-14 ENCOUNTER — DOCUMENT SCAN (OUTPATIENT)
Dept: HOME HEALTH SERVICES | Facility: HOSPITAL | Age: 85
End: 2020-09-14
Payer: MEDICARE

## 2020-09-14 PROCEDURE — 99499 UNLISTED E&M SERVICE: CPT | Mod: ,,, | Performed by: INTERNAL MEDICINE

## 2020-09-14 PROCEDURE — 99499 NO LOS: ICD-10-PCS | Mod: ,,, | Performed by: INTERNAL MEDICINE

## 2020-09-25 ENCOUNTER — DOCUMENT SCAN (OUTPATIENT)
Dept: HOME HEALTH SERVICES | Facility: HOSPITAL | Age: 85
End: 2020-09-25
Payer: MEDICARE

## 2021-09-13 ENCOUNTER — TELEPHONE (OUTPATIENT)
Dept: PRIMARY CARE CLINIC | Facility: CLINIC | Age: 86
End: 2021-09-13